# Patient Record
Sex: FEMALE | Race: WHITE | NOT HISPANIC OR LATINO | Employment: OTHER
[De-identification: names, ages, dates, MRNs, and addresses within clinical notes are randomized per-mention and may not be internally consistent; named-entity substitution may affect disease eponyms.]

---

## 2018-07-25 ENCOUNTER — TRANSCRIBE ORDERS (OUTPATIENT)
Dept: SCHEDULING | Age: 73
End: 2018-07-25

## 2018-07-25 DIAGNOSIS — G45.9 TRANSIENT CEREBRAL ISCHEMIC ATTACK: Primary | ICD-10-CM

## 2018-07-26 ENCOUNTER — HOSPITAL ENCOUNTER (OUTPATIENT)
Dept: CARDIOLOGY | Facility: HOSPITAL | Age: 73
Discharge: HOME | End: 2018-07-26
Attending: INTERNAL MEDICINE
Payer: MEDICARE

## 2018-07-26 ENCOUNTER — TRANSCRIBE ORDERS (OUTPATIENT)
Dept: RADIOLOGY | Facility: HOSPITAL | Age: 73
End: 2018-07-26

## 2018-07-26 DIAGNOSIS — G45.9 TRANSIENT CEREBRAL ISCHEMIC ATTACK: ICD-10-CM

## 2018-07-26 LAB
LEFT CCA DIST DIAS: 19.5 CM/S
LEFT CCA DIST SYS: 64.79 CM/S
LEFT CCA MID DIAS: 35.25 CM/S
LEFT CCA MID SYS: 78.57 CM/S
LEFT CCA PROX DIAS: 23.43 CM/S
LEFT CCA PROX SYS: 76.6 CM/S
LEFT ECA DIAS: 7.68 CM/S
LEFT ECA SYS: 56.89 CM/S
LEFT ICA DIST DIAS: 39.17 CM/S
LEFT ICA DIST SYS: 84.44 CM/S
LEFT ICA MID DIAS: 25.39 CM/S
LEFT ICA MID SYS: 66.73 CM/S
LEFT ICA PROX DIAS: 27.36 CM/S
LEFT ICA PROX SYS: 78.54 CM/S
LEFT ICA/CCA SYS: 1.3
LEFT SUBCLAVIAN DIAS: 0 CM/S
LEFT SUBCLAVIAN SYS: 94.87 CM/S
LEFT VERTEBRAL DIAS: 21.45 CM/S
LEFT VERTEBRAL SYS: 58.85 CM/S
LT ECA PROX EDV: 7.68 CM/S
LT ECA PROX PSV: 56.89 CM/S
LT SUBCLAVIAN PROX PSV: 94.87 CM/S
LT VERTEBRAL PROX EDV: 21.45 CM/S
LT VERTEBRAL PROX PSV: 58.85 CM/S
RIGHT CCA DIST DIAS: 22.04 CM/S
RIGHT CCA DIST SYS: 71.6 CM/S
RIGHT CCA MID DIAS: 24.65 CM/S
RIGHT CCA MID SYS: 76.82 CM/S
RIGHT CCA PROX DIAS: 18.13 CM/S
RIGHT CCA PROX SYS: 70.3 CM/S
RIGHT ECA DIAS: 6.39 CM/S
RIGHT ECA SYS: 66.39 CM/S
RIGHT ICA DIST DIAS: 20.37 CM/S
RIGHT ICA DIST SYS: 51.05 CM/S
RIGHT ICA MID DIAS: 37.69 CM/S
RIGHT ICA MID SYS: 84.65 CM/S
RIGHT ICA PROX DIAS: 15.52 CM/S
RIGHT ICA PROX SYS: 44.21 CM/S
RIGHT ICA/CCA SYS: 1.18
RIGHT SUBCLAVIAN DIAS: 0 CM/S
RIGHT SUBCLAVIAN SYS: 96.39 CM/S
RIGHT VERTEBRAL DIAS: 15.28 CM/S
RIGHT VERTEBRAL SYS: 44.96 CM/S
RT ECA PROC EDV: 6.39 CM/S
RT SUBCLAVIAN PROX PSV: 96.39 CM/S
RT VERTEBRAL PROX EDV: 15.28 CM/S

## 2018-07-26 PROCEDURE — 93880 EXTRACRANIAL BILAT STUDY: CPT | Mod: 26 | Performed by: PSYCHIATRY & NEUROLOGY

## 2018-07-26 PROCEDURE — 93880 EXTRACRANIAL BILAT STUDY: CPT

## 2019-10-04 ENCOUNTER — TRANSCRIBE ORDERS (OUTPATIENT)
Dept: SCHEDULING | Age: 74
End: 2019-10-04

## 2019-10-04 DIAGNOSIS — M81.0 AGE-RELATED OSTEOPOROSIS WITHOUT CURRENT PATHOLOGICAL FRACTURE: Primary | ICD-10-CM

## 2019-10-11 ENCOUNTER — HOSPITAL ENCOUNTER (OUTPATIENT)
Dept: RADIOLOGY | Age: 74
Discharge: HOME | End: 2019-10-11
Attending: INTERNAL MEDICINE
Payer: MEDICARE

## 2019-10-11 DIAGNOSIS — M81.0 AGE-RELATED OSTEOPOROSIS WITHOUT CURRENT PATHOLOGICAL FRACTURE: ICD-10-CM

## 2019-10-11 PROCEDURE — 77080 DXA BONE DENSITY AXIAL: CPT

## 2019-11-07 ENCOUNTER — TRANSCRIBE ORDERS (OUTPATIENT)
Dept: SCHEDULING | Age: 74
End: 2019-11-07

## 2019-11-07 ENCOUNTER — TRANSCRIBE ORDERS (OUTPATIENT)
Dept: CARDIOLOGY | Facility: HOSPITAL | Age: 74
End: 2019-11-07

## 2019-11-07 DIAGNOSIS — G43.109 MIGRAINE WITH AURA, NOT INTRACTABLE, WITHOUT STATUS MIGRAINOSUS: Primary | ICD-10-CM

## 2019-11-07 DIAGNOSIS — I65.23 OCCLUSION AND STENOSIS OF BILATERAL CAROTID ARTERIES: ICD-10-CM

## 2021-03-01 ENCOUNTER — TRANSCRIBE ORDERS (OUTPATIENT)
Dept: SCHEDULING | Age: 76
End: 2021-03-01

## 2021-03-01 DIAGNOSIS — M81.0 AGE-RELATED OSTEOPOROSIS WITHOUT CURRENT PATHOLOGICAL FRACTURE: Primary | ICD-10-CM

## 2021-04-15 DIAGNOSIS — Z23 ENCOUNTER FOR IMMUNIZATION: ICD-10-CM

## 2021-04-19 ENCOUNTER — HOSPITAL ENCOUNTER (OUTPATIENT)
Dept: RADIOLOGY | Age: 76
Discharge: HOME | End: 2021-04-19
Attending: INTERNAL MEDICINE
Payer: MEDICARE

## 2021-04-19 DIAGNOSIS — M81.0 AGE-RELATED OSTEOPOROSIS WITHOUT CURRENT PATHOLOGICAL FRACTURE: ICD-10-CM

## 2021-04-19 PROCEDURE — 77080 DXA BONE DENSITY AXIAL: CPT

## 2021-09-09 ENCOUNTER — HOSPITAL ENCOUNTER (OUTPATIENT)
Dept: PSYCHOLOGY | Facility: CLINIC | Age: 76
Discharge: HOME | End: 2021-09-09
Payer: MEDICARE

## 2021-09-09 DIAGNOSIS — G31.84 MCI (MILD COGNITIVE IMPAIRMENT) WITH MEMORY LOSS: Primary | ICD-10-CM

## 2021-09-09 PROCEDURE — 96133 NRPSYC TST EVAL PHYS/QHP EA: CPT | Performed by: CLINICAL NEUROPSYCHOLOGIST

## 2021-09-09 PROCEDURE — 96132 NRPSYC TST EVAL PHYS/QHP 1ST: CPT | Performed by: CLINICAL NEUROPSYCHOLOGIST

## 2021-09-09 PROCEDURE — 96137 PSYCL/NRPSYC TST PHY/QHP EA: CPT | Performed by: CLINICAL NEUROPSYCHOLOGIST

## 2021-09-09 PROCEDURE — 96136 PSYCL/NRPSYC TST PHY/QHP 1ST: CPT | Performed by: CLINICAL NEUROPSYCHOLOGIST

## 2021-09-12 NOTE — PROGRESS NOTES
NEUROPSYCHOLOGICAL EVALUATION    CONFIDENTIAL   FOR PROFESSIONAL USE ONLY    Name: Deb Yu                                    : 1945  Evaluation Date: 2021  Referring Physician: Dr. Sonny Wilkinson    Mrs. Deb Figueredo was seen for neuropsychological evaluation on 2021.  She is a 76-year-old right-hand-dominant female who was referred for an assessment of her present cognitive abilities.  She has concerns about perceived changes in her cognitive abilities that she feels have been evident over the last year.  She describes herself as being more forgetful and will forget information such as her Social Security number or the number of units of insulin she has to give her cat who suffers from diabetes.  She does feel she has generally good recall for details of conversations as well as information she reads.  She remains independent in managing her medications, and she is able to keep track of her daily schedule and appointments.  There was no report of significant changes in her receptive and expressive language abilities.  She denies word finding or word retrieval difficulties.  She will forget at times the correct pronunciation for words.  She denies symptoms of inattention or increased distractibility.  She is able to complete tasks involving sustained and complex attention.  She is independent in her activities of daily living and self-care skills.  She is independent her completion of various household tasks and routines.  From a reasoning standpoint, she continues to operate a motor vehicle safely.  She denies difficulty in being able to manage her finances independently.    Physically she denies headaches.  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  She does report occasional symptoms of lightheadedness which she attributes to fluctuations in her blood pressure.  There was no report of changes in her  balance and coordination.  She denies recent falls.  There was no report of persisting symptoms of fatigue or tiredness.  She does become tired after she eats.  There was no report of difficulties in sleeping.  She does use melatonin for sleep.  There was no report of acute visual changes.  She denies blurred vision or double vision.  She denies hearing loss or tinnitus.  She has gained approximately 10 pounds since the onset of the pandemic.    Emotionally she denies acute symptoms of depression.  There was no report of feelings of sadness or hopelessness.  She did admit to a past history of anxiety.  She does use Librax on occasion for acute intestinal problems.  She does feel her anxiety level has improved more recently.    Her medical history is unremarkable for prior head trauma or concussion.  She denies prior episodes of loss of consciousness or seizure disorder.  Her history is notable for migraine headaches in the past, irritable bowel syndrome, supraventricular tachycardia, hypothyroidism, and hyperglycemia.  A prior brain MRI and EEG were unremarkable.    Her current medications include; Caltrate plus D, Centrum silver, vitamin D, Bentyl, Bumex, Celebrex, cyclobenzaprine, Flonase, ipratropium bromide, metoprolol succinate, Protonix, Singulair, Zyrtec, and Librax on an as needed basis.    Her family medical history is remarkable for her mother who  at age 87 and suffered from lung cancer and depression.  She had no history of memory difficulties.  Her father  at age 89 and developed symptoms of dementia in his 80s.  Also he had a history of cardiovascular disease and had undergone cardiac bypass surgery and an aortic valve replacement.  Her maternal grandfather had a history of lung cancer.    Her psychological history is unremarkable for prior counseling.  As noted above, she has been on Librax on an as-needed basis for intestinal difficulties.    Her alcohol consumption is limited to an  occasional social situation.  She is a non-smoker.    Educationally she graduated from [employer] with a degree in music education.  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  There is no history of learning disabilities or attention deficit disorder.    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 She retired from teaching at age 37.    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    She is  for the second time for the past 44 years.  She has a daughter from her first marriage and one daughter from her second marriage.  Her current  has a son from his first marriage.    Test Results:  General Memory Functioning: Her general memory functions as assessed by the Wechsler Memory Scale-IV showed general consistency across the various index scores.  Her auditory memory abilities were in the bright average range (AMI = 115; 84th percentile).  Her visual memory abilities were in the very superior range (BMI = 139; 99.9 percentile).  Her immediate memory abilities were in the superior range (IMI = 124; 95th percentile).  Her delayed memory abilities were in the superior range (DMI = 125; 95th percentile).  She is alert and oriented x3.  She did show adequate estimation of the time of day.  Her automatic memory skills were intact as she was able to count backwards by ones and recite the months of the year backwards.  There was no indication of a constructional dyspraxia in her clock drawing.  Her incidental recall was intact.  She showed no impairment on a measure of her verbal fluency.  Her immediate auditory verbal recall for brief logical passages was in the bright average range.  There were no intrusions or confabulation noted in her responses.  Her delayed auditory  verbal recall was in the upper range of average.  Her immediate complex verbal recall was in the bright average range.  Her learning curve on this measure was consistent with age and educational expectations.  Her delayed complex verbal recall was in the bright average range.    Immediate complex visual recall was in the very superior range.  Her delayed complex visual recall was in the very superior range.    She showed no indication of decline from premorbid expectations.    I did administer a measure of her verbal abstract reasoning abilities which was in the bright average range.  She scored in the bright average range as well on a measure of her nonverbal reasoning abilities.    Concept Formation: On a structured task requiring her to shift rapidly between two alternating and ongoing sequences (Beasley Making Test: Part B) her performance was in the average range.  On a measure of her rapid visual tracking (Trail Making Test: Part A) her performance was in the average range.    Emotional Functioning: On the Carrera Depression Inventory-II, she does not endorse any symptoms of depression.  She denies suicidal thoughts or ideation of death.  There was no acute feelings of sadness or hopelessness.  She denies a reduced loss of interest in activities or social events.  There was no report of vegetative symptoms of depression.  She denies feelings of restlessness or increased irritability.  On the Carrera Anxiety Inventory, she denies acute symptoms of anxiety.  There was no report of feelings of nervousness or inability to relax.  She denies panic-like symptoms.    Diagnostic Impressions: Current neuropsychological test results show intact performance on measures of her primary memory, verbal and nonverbal reasoning, working memory, and on measures of her complex attention.  There was no suggestion from testing of neurocognitive decline.  She presents with neither symptoms of dementia nor mild cognitive impairment.  Her  performance on current measures are consistent with expected baselines.    I did have an opportunity to provide feedback to Mrs. Figueredo regarding her present test results and reassured her there was no indication of an acute neurological disorder.  She had been concerned about the possibility of dementia given her father's history.  Her current test measures will serve as baselines for any future comparisons.  She will return to see Dr. Wilkinson in the near future to discuss further recommendations regarding her management.    Jd Sawant PsyD  Clinical Specialist Neuropsychologist  Licensed Psychologist

## 2022-08-24 ENCOUNTER — TRANSCRIBE ORDERS (OUTPATIENT)
Dept: SCHEDULING | Age: 77
End: 2022-08-24

## 2022-08-24 DIAGNOSIS — I82.90 ACUTE EMBOLISM AND THROMBOSIS OF UNSPECIFIED VEIN: Primary | ICD-10-CM

## 2022-09-26 ENCOUNTER — TRANSCRIBE ORDERS (OUTPATIENT)
Dept: SCHEDULING | Age: 77
End: 2022-09-26

## 2022-09-26 DIAGNOSIS — I73.9 PERIPHERAL VASCULAR DISEASE, UNSPECIFIED (CMS/HCC): Primary | ICD-10-CM

## 2022-10-04 ENCOUNTER — HOSPITAL ENCOUNTER (OUTPATIENT)
Dept: CARDIOLOGY | Age: 77
Discharge: HOME | End: 2022-10-04
Attending: INTERNAL MEDICINE
Payer: MEDICARE

## 2022-10-04 DIAGNOSIS — I73.9 PERIPHERAL VASCULAR DISEASE, UNSPECIFIED (CMS/HCC): ICD-10-CM

## 2022-10-04 LAB
LT CFA PROX PSV: 78.96 CM/S
LT PERONEAL MID PSV: 60.72 CM/S
LT POPLITEAL MID PSV: 60.68 CM/S
LT POST TIBIAL MID PSV: 36.86 CM/S
LT PROFUNDA PROX PSV: 59.62 CM/S
LT SFA DIST PSV: 55.65 CM/S
LT SFA MID PSV: 83.32 CM/S
LT SFA PROX PSV: 95.89 CM/S
RT CFA PROX PSV: 88.03 CM/S
RT PERONEAL MID PSV: 72.92 CM/S
RT POPLITEAL MID PSV: 48.7 CM/S
RT POST TIBIAL MID PSV: 54.78 CM/S
RT PROFUNDA PROX PSV: 85.01 CM/S
RT SFA DIST PSV: 57.8 CM/S
RT SFA MID PSV: 72.92 CM/S
RT SFA PROX PSV: 81.98 CM/S

## 2022-10-04 PROCEDURE — 93925 LOWER EXTREMITY STUDY: CPT

## 2023-04-10 ENCOUNTER — TRANSCRIBE ORDERS (OUTPATIENT)
Dept: SCHEDULING | Age: 78
End: 2023-04-10

## 2023-04-10 DIAGNOSIS — M85.88 OTHER SPECIFIED DISORDERS OF BONE DENSITY AND STRUCTURE, OTHER SITE: Primary | ICD-10-CM

## 2023-04-27 ENCOUNTER — HOSPITAL ENCOUNTER (OUTPATIENT)
Dept: RADIOLOGY | Age: 78
Discharge: HOME | End: 2023-04-27
Attending: INTERNAL MEDICINE
Payer: MEDICARE

## 2023-04-27 DIAGNOSIS — M85.88 OTHER SPECIFIED DISORDERS OF BONE DENSITY AND STRUCTURE, OTHER SITE: ICD-10-CM

## 2023-04-27 PROCEDURE — 77080 DXA BONE DENSITY AXIAL: CPT

## 2025-07-25 ENCOUNTER — HOSPITAL ENCOUNTER (OUTPATIENT)
Dept: RADIOLOGY | Age: 80
Discharge: HOME | End: 2025-07-25
Attending: INTERNAL MEDICINE
Payer: MEDICARE

## 2025-07-25 DIAGNOSIS — M81.0 OSTEOPOROSIS: ICD-10-CM

## 2025-07-25 PROCEDURE — 77080 DXA BONE DENSITY AXIAL: CPT
